# Patient Record
Sex: FEMALE | Race: AMERICAN INDIAN OR ALASKA NATIVE
[De-identification: names, ages, dates, MRNs, and addresses within clinical notes are randomized per-mention and may not be internally consistent; named-entity substitution may affect disease eponyms.]

---

## 2019-04-05 ENCOUNTER — HOSPITAL ENCOUNTER (EMERGENCY)
Dept: HOSPITAL 5 - ED | Age: 34
Discharge: HOME | End: 2019-04-05
Payer: COMMERCIAL

## 2019-04-05 VITALS — SYSTOLIC BLOOD PRESSURE: 134 MMHG | DIASTOLIC BLOOD PRESSURE: 94 MMHG

## 2019-04-05 DIAGNOSIS — S61.031A: Primary | ICD-10-CM
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
## 2019-04-05 DIAGNOSIS — I80.9: ICD-10-CM

## 2019-04-05 DIAGNOSIS — L03.011: ICD-10-CM
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
## 2019-04-05 DIAGNOSIS — F17.200: ICD-10-CM
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
## 2019-04-05 NOTE — EMERGENCY DEPARTMENT REPORT
ED Extremity Problem HPI





- General


Chief complaint: Extremity Injury, Upper


Stated complaint: RT HAND INJURY


Time Seen by Provider: 19 12:00


Source: patient


Mode of arrival: Ambulatory


Limitations: No Limitations





- History of Present Illness


Initial comments: 





Patient is a 34-year-old Female who cut her right thumb pad on a piece of glass 

yesterday.  Patient states that there is some localized pain however now she has

a red streak going up the right forearm all the way to the axilla.  Patient 

states there is aching throbbing pain is 8 out of 10 in severity.  She denies 

any fever nausea vomiting or diarrhea at this time.


Severity scale (0 -10): 10





- Related Data


                                  Previous Rx's











 Medication  Instructions  Recorded  Last Taken  Type


 


Clindamycin [Clindamycin CAP] 300 mg PO Q8H #30 cap 19 Unknown Rx


 


HYDROcodone/ACETAMINOPHEN 1 each PO Q6HR PRN #15 tablet 19 Unknown Rx





[Hydrocodone-Acetamin 5-325 mg]    


 


Ibuprofen [Ibu] 800 mg PO Q8H PRN #20 tablet 19 Unknown Rx











                                    Allergies











Allergy/AdvReac Type Severity Reaction Status Date / Time


 


No Known Allergies Allergy   Unverified 19 12:00














ED Review of Systems


ROS: 


Stated complaint: RT HAND INJURY


Other details as noted in HPI





Comment: All other systems reviewed and negative





ED Past Medical Hx





- Past Medical History


Previous Medical History?: No





- Surgical History


Past Surgical History?: Yes


Additional Surgical History: ectopic





- Social History


Smoking Status: Current Every Day Smoker


Substance Use Type: Cocaine





- Medications


Home Medications: 


                                Home Medications











 Medication  Instructions  Recorded  Confirmed  Last Taken  Type


 


Clindamycin [Clindamycin CAP] 300 mg PO Q8H #30 cap 19  Unknown Rx


 


HYDROcodone/ACETAMINOPHEN 1 each PO Q6HR PRN #15 tablet 19  Unknown Rx





[Hydrocodone-Acetamin 5-325 mg]     


 


Ibuprofen [Ibu] 800 mg PO Q8H PRN #20 tablet 19  Unknown Rx














ED Physical Exam





- General


Limitations: No Limitations


General appearance: alert, in no apparent distress





- Head


Head exam: Present: atraumatic, normocephalic





- Eye


Eye exam: Present: normal appearance





- ENT


ENT exam: Present: mucous membranes moist





- Neck


Neck exam: Present: normal inspection





- Respiratory


Respiratory exam: Present: normal lung sounds bilaterally.  Absent: respiratory 

distress, wheezes, rales





- Cardiovascular


Cardiovascular Exam: Present: regular rate, normal rhythm.  Absent: systolic 

murmur, diastolic murmur, rubs, gallop





- GI/Abdominal


GI/Abdominal exam: Present: soft, normal bowel sounds.  Absent: distended, 

tenderness, guarding, rebound





- Extremities Exam


Extremities exam: Present: normal inspection





- Expanded Upper Extremity Exam


  ** Right


Hand Wrist exam: Present: tenderness, swelling (patient with a small puncture to

 the pad of the right thumb.  There is some localized swelling, no purulent 

drainage.  Patient has a red streak that extends from this area of the forearm 

through the antecubital fossa and up the medial aspect of the upper arm)





- Back Exam


Back exam: Present: normal inspection





- Neurological Exam


Neurological exam: Present: alert, oriented X3





- Psychiatric


Psychiatric exam: Present: normal affect, normal mood





- Skin


Skin exam: Present: warm, dry, intact, normal color.  Absent: rash





ED Course





                                   Vital Signs











  19





  11:42 12:00


 


Temperature 98.5 F 98.5 F


 


Pulse Rate 101 H 101 H


 


Respiratory 18 16





Rate  


 


Blood Pressure  134/94


 


Blood Pressure 134/94 





[Right]  


 


O2 Sat by Pulse 100 100





Oximetry  














ED Medical Decision Making





- Radiology Data





[facility] 


[address] [place] [address] 





XRay Report 


Signed 





Patient: JOVI JACKSON MR#: [id number] 


043 


: 1985 Acct:[id number] 





Age/Sex: 34 / F ADM Date: 19 





Loc: ED 


Attending Dr: 








Ordering Physician: AARON SANTORO 


Date of Service: 19 


Procedure(s): XR finger(s) 2+V RT 


Accession Number(s): [id number] 





cc: AARON SANTORO 





Fluoro Time In Minutes: 





RIGHT FINGER RADIOGRAPHS 





INDICATION: Foreign body in thumb. 





COMPARISON: None similar at this institution. 





FINDINGS: AP view of the right hand with oblique and lateral 


projections to evaluate the thumb demonstrate normal bones, joints and 


soft tissues. 





CONCLUSION: No acute right thumb radiographic abnormality or definite 


radioopaque foreign body noted, as described. Please correlate. 





Thank you for the opportunity to participate in this patient's care. 





Transcribed By: RS 


Dictated By: ROBINA LYNCH MD 


Electronically Authenticated By: ROBINA LYNCH MD 


Signed Date/Time: 19 1239 











DD/DT: 19 123 


TD/TT: 19 123





- Medical Decision Making





Patient with localized cellulitis secondary to a puncture on the the right 

thumb.  Patient with some lymphatic extension of this infection.  Patient 

started on clindamycin and patient given meds for symptomatically relief.


Critical care attestation.: 


If time is entered above; I have spent that time in minutes in the direct care 

of this critically ill patient, excluding procedure time.








ED Disposition


Clinical Impression: 


 Puncture wound, Phlebitis





Cellulitis of thumb


Qualifiers:


 Laterality: right Qualified Code(s): L03.011 - Cellulitis of right finger





Disposition: - TO HOME OR SELFCARE


Is pt being admited?: No


Does the pt Need Aspirin: No


Condition: Stable


Instructions:  Superficial Thrombophlebitis (ED), Cellulitis (ED)


Referrals: 


[facility] [Primary Care Provider] - 3-5 Days


Time of Disposition: 13:05

## 2019-04-05 NOTE — EMERGENCY DEPARTMENT REPORT
Chief Complaint: Extremity Injury, Upper


Stated Complaint: RT HAND INJURY


Time Seen by Provider: 04/05/19 12:00





- HPI


History of Present Illness: 





fb glass in r thumb with phlebitis up right arm


cocaine use





lmp 


4/1





[facility]


chronic pain sees chiropractor





psh


ectopic





rx


meloxicam


muscle relaxer





mse completed








- Exam


Vital Signs: 


                                   Vital Signs











  04/05/19





  11:42


 


Temperature 98.5 F


 


Pulse Rate 101 H


 


Respiratory 18





Rate 


 


Blood Pressure 134/94





[Right] 


 


O2 Sat by Pulse 100





Oximetry 











MSE screening note: 


Focused history and physical exam performed.


Due to findings the following was ordered:











ED Disposition for MSE


Condition: Stable

## 2019-04-05 NOTE — XRAY REPORT
RIGHT FINGER RADIOGRAPHS



INDICATION: Foreign body in thumb.



COMPARISON: None similar at this institution.



FINDINGS: AP view of the right hand with oblique and lateral 

projections to evaluate the thumb demonstrate normal bones, joints and 

soft tissues.



CONCLUSION: No acute right thumb radiographic abnormality or definite 

radioopaque foreign body noted, as described.  Please correlate.



Thank you for the opportunity to participate in this patient's care.

## 2019-04-13 ENCOUNTER — HOSPITAL ENCOUNTER (EMERGENCY)
Dept: HOSPITAL 5 - ED | Age: 34
Discharge: HOME | End: 2019-04-13
Payer: COMMERCIAL

## 2019-04-13 VITALS — DIASTOLIC BLOOD PRESSURE: 85 MMHG | SYSTOLIC BLOOD PRESSURE: 116 MMHG

## 2019-04-13 DIAGNOSIS — S61.031A: Primary | ICD-10-CM
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
## 2019-04-13 DIAGNOSIS — L03.011: ICD-10-CM

## 2019-04-13 NOTE — XRAY REPORT
EXAM:    RIGHT THUMB X-RAY SERIES 

 

HISTORY:  swelling pain, fb  laceration rt thumb 

 

TECHNIQUE: 3 views 

 

COMPARISON: None available. 

 

FINDINGS:  

 

There is no acute bony fracture, or joint subluxation or dislocation seen. No evidence for inflammato
ry or degenerative arthritis is seen. No focal bone erosion or sclerosis is seen.  No soft tissue emp
hysema, radiodense soft tissue abnormality or foreign body is seen. 

 

IMPRESSION:  

 

1.  No acute bony fracture, or joint subluxation or dislocation seen. 

 

2.  No soft tissue emphysema, radiodense soft tissue abnormality or foreign body seen. 

 

  

 

This document is electronically signed by Ramón Espinoza MD., April 13 2019 12:57:05 PM ET

## 2019-04-13 NOTE — EMERGENCY DEPARTMENT REPORT
Blank Doc





- Documentation


Documentation: 





34 y o female return to ED with worsening thumb pain and swelling from puncture 

wound


 swelling noted to thumb


xray

## 2019-04-13 NOTE — EMERGENCY DEPARTMENT REPORT
Chief Complaint: Wound/Laceration


Stated Complaint: RT THUMB SWELLING


Time Seen by Provider: 04/13/19 12:08





- HPI


History of Present Illness: 


34-year-old female returns to the ED complaining of swelling to the right thumb.

 Patient was seen here and evaluated on Friday for the same symptoms.


Patient states her thumb is getting stool and a worsening in pain.  She denies 

fevers/chills/nausea vomiting/any repeat injury or trauma to the finger.








- ROS


Review of Systems: 





As noted in HPI





- Exam


Vital Signs: 


                                   Vital Signs











  04/13/19





  12:02


 


Temperature 98.3 F


 


Pulse Rate 104 H


 


Respiratory 20





Rate 


 


Blood Pressure 116/85


 


O2 Sat by Pulse 98





Oximetry 











Physical Exam: 


Finger: Thumb is swollen, tender to palpation, erythematous, blanched as wall.  

Nailbed is intact.








MSE screening note: 


Focused history and physical exam performed.


Due to findings the following was ordered:











ED Medical Decision Making





- Radiology Data


Radiology results: report reviewed, image reviewed





No acute, fracture, foreign body seen in the x-ray





- Medical Decision Making





There is a 34 year female presents with finger infection status post a puncture 

wound.  


patient was evaluated here today and was referred to orthopedic surgeon/hand 

surgeon.


Discussed the palpation that is imperative for her to call and make an 

appointment as soon as possible.  Patient states she understands.  Patient is 

currently taken Bactrim and Clindamycin.  Discussed the patient to continue 

taking those antibiotics.


Vital signs are normal patient is in no acute distress.








ED Disposition for MSE


Clinical Impression: 


 Puncture wound, Cellulitis of thumb





Disposition: DC-01 TO HOME OR SELFCARE


Is pt being admited?: No


Does the pt Need Aspirin: No


Condition: Stable


Instructions:  Cellulitis (ED)


Additional Instructions: 


Make sure to follow up with the orthopedic doctor as discussed.


Take all your medications as you've been prescribed.


If you have any worsening symptoms or develop new symptoms please return to ED 

immediately.


Prescriptions: 


Naproxen [Naprosyn] 500 mg PO BID #30 tablet


Referrals: 


[facility] [Primary Care Provider] - 3-5 Days


REBECCA DELCID MD [Staff Physician] - 3-5 Days


[facility] [Provider Group] - 3-5 Days


[facility],  [Provider Group] - 3-5 Days


[facility] [Provider Group] - 3-5 Days


Forms:  Work/School Release Form(ED)


Time of Disposition: 13:11